# Patient Record
Sex: FEMALE | Race: WHITE | NOT HISPANIC OR LATINO
[De-identification: names, ages, dates, MRNs, and addresses within clinical notes are randomized per-mention and may not be internally consistent; named-entity substitution may affect disease eponyms.]

---

## 2021-01-20 ENCOUNTER — RESULT REVIEW (OUTPATIENT)
Age: 36
End: 2021-01-20

## 2022-03-21 PROBLEM — Z00.00 ENCOUNTER FOR PREVENTIVE HEALTH EXAMINATION: Status: ACTIVE | Noted: 2022-03-21

## 2022-03-24 DIAGNOSIS — Z86.59 PERSONAL HISTORY OF OTHER MENTAL AND BEHAVIORAL DISORDERS: ICD-10-CM

## 2022-03-24 DIAGNOSIS — Z98.890 OTHER SPECIFIED POSTPROCEDURAL STATES: ICD-10-CM

## 2022-03-24 DIAGNOSIS — Z86.03 PERSONAL HISTORY OF NEOPLASM OF UNCERTAIN BEHAVIOR: ICD-10-CM

## 2022-03-28 ENCOUNTER — APPOINTMENT (OUTPATIENT)
Dept: SURGERY | Facility: CLINIC | Age: 37
End: 2022-03-28
Payer: COMMERCIAL

## 2022-03-28 VITALS
WEIGHT: 125 LBS | BODY MASS INDEX: 22.15 KG/M2 | SYSTOLIC BLOOD PRESSURE: 116 MMHG | DIASTOLIC BLOOD PRESSURE: 71 MMHG | TEMPERATURE: 97.4 F | HEART RATE: 66 BPM | HEIGHT: 63 IN

## 2022-03-28 DIAGNOSIS — Z78.9 OTHER SPECIFIED HEALTH STATUS: ICD-10-CM

## 2022-03-28 NOTE — CONSULT LETTER
[Dear  ___] : Dear  [unfilled], [( Thank you for referring [unfilled] for consultation for _____ )] : Thank you for referring [unfilled] for consultation for [unfilled] [Please see my note below.] : Please see my note below. [Consult Closing:] : Thank you very much for allowing me to participate in the care of this patient.  If you have any questions, please do not hesitate to contact me. [Sincerely,] : Sincerely, [FreeTextEntry3] : Gabriela Mccrary MD

## 2022-03-28 NOTE — HISTORY OF PRESENT ILLNESS
[de-identified] : 37 yo F referred for evaluation of possible hernia. The patient is 7 months postpartum from her second child. After her first, she thinks she developed an umbilical hernia that never resolved. After this pregnancy, she has been unable to flatter her abdomen despite being at baseline weight. She reports occasional nausea but improved bowel habits. She denies pain, fevers, chills. She had  x 2 but no other abdominal operations.

## 2022-03-28 NOTE — ASSESSMENT
[FreeTextEntry1] : 37 yo F with large diastasis recti and small umbilical hernia. We discussed the anatomy of the abdominal wall and the risks of hernias, which in her case is small and likely not posing any significant risks of incarceration. However, I believe she would benefit from plastic surgery consultation for possible diastasis repair with abdominoplasty in conjunction with a hernia repair. She will consider this.\par \par

## 2022-04-07 ENCOUNTER — APPOINTMENT (OUTPATIENT)
Dept: PLASTIC SURGERY | Facility: CLINIC | Age: 37
End: 2022-04-07
Payer: SELF-PAY

## 2022-04-07 VITALS
DIASTOLIC BLOOD PRESSURE: 69 MMHG | SYSTOLIC BLOOD PRESSURE: 106 MMHG | TEMPERATURE: 98.7 F | OXYGEN SATURATION: 100 % | HEART RATE: 80 BPM | RESPIRATION RATE: 20 BRPM

## 2022-04-07 VITALS
DIASTOLIC BLOOD PRESSURE: 70 MMHG | HEIGHT: 63 IN | SYSTOLIC BLOOD PRESSURE: 110 MMHG | WEIGHT: 125 LBS | BODY MASS INDEX: 22.15 KG/M2

## 2022-04-07 NOTE — HISTORY OF PRESENT ILLNESS
[FreeTextEntry1] : Patient 7 months post c section (9.5 lb baby) with umbilical hernia and large rectus diastasis with central protrusion of the abdomen.  She is bothered by bulging and skin excess and while scheduled for hernia repair with Dr. Mccrary wishes to discuss possible concurrent Abdominoplasty.  She is otherwise in good health and is a non smoker

## 2022-04-07 NOTE — ASSESSMENT
[FreeTextEntry1] : A:\par Healthy 36 year old woman with umbilical hernia for repair with Dr. Mccrary noted to have a large rectus diastasis with central bulging.\par P:\par We discussed the treatment options at some length and favor Abdominoplasty with rectus plication at the time of her hernia repair.  Reviewed the material risks,  benefits,  and alternatives with Ms. GHANSHYAM YVROSE  , including no surgery, and she  understands and wishes to proceed.\par

## 2022-04-07 NOTE — PHYSICAL EXAM
[NI] : Normal [de-identified] : central bulging in abdomen with 4-6 cm diastasis, and skin excess\par small umbilical hernia palpated

## 2022-04-07 NOTE — REASON FOR VISIT
[Consultation] : a consultation visit [FreeTextEntry1] : 36 year old women with umbilical hernia and rectus diastasis referred by her surgeon for evaluation for Abdominoplasty at time of hernia repair

## 2022-04-27 ENCOUNTER — RESULT REVIEW (OUTPATIENT)
Age: 37
End: 2022-04-27

## 2023-12-04 ENCOUNTER — TRANSCRIPTION ENCOUNTER (OUTPATIENT)
Age: 38
End: 2023-12-04

## 2023-12-06 ENCOUNTER — NON-APPOINTMENT (OUTPATIENT)
Age: 38
End: 2023-12-06

## 2023-12-11 ENCOUNTER — APPOINTMENT (OUTPATIENT)
Dept: SURGERY | Facility: CLINIC | Age: 38
End: 2023-12-11
Payer: COMMERCIAL

## 2023-12-11 VITALS
HEART RATE: 67 BPM | SYSTOLIC BLOOD PRESSURE: 100 MMHG | DIASTOLIC BLOOD PRESSURE: 62 MMHG | HEIGHT: 63 IN | WEIGHT: 125 LBS | BODY MASS INDEX: 22.15 KG/M2

## 2024-01-03 ENCOUNTER — APPOINTMENT (OUTPATIENT)
Dept: PLASTIC SURGERY | Facility: CLINIC | Age: 39
End: 2024-01-03
Payer: SELF-PAY

## 2024-01-03 VITALS
BODY MASS INDEX: 23.03 KG/M2 | OXYGEN SATURATION: 99 % | HEART RATE: 67 BPM | SYSTOLIC BLOOD PRESSURE: 110 MMHG | DIASTOLIC BLOOD PRESSURE: 73 MMHG | WEIGHT: 130 LBS

## 2024-01-03 DIAGNOSIS — K42.9 UMBILICAL HERNIA W/OUT OBSTRUCTION OR GANGRENE: ICD-10-CM

## 2024-01-03 RX ORDER — HYDROCORTISONE 25 MG/G
2.5 CREAM TOPICAL
Qty: 20 | Refills: 0 | Status: COMPLETED | COMMUNITY
Start: 2021-12-16 | End: 2024-01-03

## 2024-01-03 RX ORDER — CLINDAMYCIN HYDROCHLORIDE 300 MG/1
300 CAPSULE ORAL
Qty: 40 | Refills: 0 | Status: COMPLETED | COMMUNITY
Start: 2021-10-07 | End: 2024-01-03

## 2024-01-03 RX ORDER — METHYLPREDNISOLONE 4 MG/1
4 TABLET ORAL
Qty: 21 | Refills: 0 | Status: COMPLETED | COMMUNITY
Start: 2021-12-09 | End: 2024-01-03

## 2024-01-03 RX ORDER — TRIAMCINOLONE ACETONIDE 1 MG/G
0.1 CREAM TOPICAL
Qty: 80 | Refills: 0 | Status: COMPLETED | COMMUNITY
Start: 2021-12-13 | End: 2024-01-03

## 2024-01-03 NOTE — REASON FOR VISIT
[Follow-Up: _____] : a [unfilled] follow-up visit [FreeTextEntry1] : Ms. GHANSHYAM FRANCES returns for a follow up visit, generally doing well with no complaints and no fevers or chills at home, planning hernia repair with Dr. Mccrary and inerested in ABdominoplasty with rectus diastasis repair at same surgery

## 2024-01-03 NOTE — PHYSICAL EXAM
[NI] : Normal [de-identified] :  central bulging in abdomen with 4-6 cm diastasis, and skin excess Umbilical hernia palpated

## 2024-01-03 NOTE — ASSESSMENT
[FreeTextEntry1] : A: 6 cm rectus diastasis with lower abdominal skin excess in 38-year-old woman with umbilical hernia following childbirth.  P: Patient scheduled for herniorrhaphy with Dr. Mccrary and interested in concomitant Abdominoplasty with repair rectus diastasis.  Reviewed the material risks,  benefits,  and alternatives with Ms. GHANSHYAM FRANCES  , including no surgery, and she  understands and wishes to proceed.

## 2024-03-01 ENCOUNTER — APPOINTMENT (OUTPATIENT)
Dept: SURGERY | Facility: HOSPITAL | Age: 39
End: 2024-03-01

## 2024-03-01 ENCOUNTER — TRANSCRIPTION ENCOUNTER (OUTPATIENT)
Age: 39
End: 2024-03-01

## 2024-03-01 ENCOUNTER — APPOINTMENT (OUTPATIENT)
Dept: PLASTIC SURGERY | Facility: HOSPITAL | Age: 39
End: 2024-03-01
Payer: SELF-PAY

## 2024-03-07 ENCOUNTER — APPOINTMENT (OUTPATIENT)
Dept: PLASTIC SURGERY | Facility: CLINIC | Age: 39
End: 2024-03-07
Payer: COMMERCIAL

## 2024-03-07 VITALS
HEART RATE: 70 BPM | DIASTOLIC BLOOD PRESSURE: 69 MMHG | RESPIRATION RATE: 22 BRPM | TEMPERATURE: 97.2 F | OXYGEN SATURATION: 99 % | SYSTOLIC BLOOD PRESSURE: 107 MMHG

## 2024-03-07 NOTE — REASON FOR VISIT
[Post Op: _________] : a [unfilled] post op visit [FreeTextEntry1] : Ms. GHANSHYAM FRANCES returns for a follow up visit, generally doing well with no complaints and no fevers or chills at home, drains less than 20 cc per 24 hours

## 2024-03-07 NOTE — PHYSICAL EXAM
[NI] : Normal [de-identified] : Shape and contour are good, incisions are clean and intact. Flaps are viable with no palpable collection

## 2024-03-21 ENCOUNTER — APPOINTMENT (OUTPATIENT)
Dept: PLASTIC SURGERY | Facility: CLINIC | Age: 39
End: 2024-03-21
Payer: COMMERCIAL

## 2024-03-21 VITALS
OXYGEN SATURATION: 99 % | SYSTOLIC BLOOD PRESSURE: 103 MMHG | TEMPERATURE: 97.9 F | HEART RATE: 75 BPM | DIASTOLIC BLOOD PRESSURE: 76 MMHG

## 2024-03-21 NOTE — REASON FOR VISIT
[FreeTextEntry1] : Ms. GHANSHYAM FRANCES returns for a follow up visit, generally doing well with no complaints and no fevers or chills at home, generally pleased with results of her surgery  [Post Op: _________] : a [unfilled] post op visit

## 2024-03-21 NOTE — PHYSICAL EXAM
[de-identified] : Shape and contour are good- Prineo removed, scar clean and intact [NI] : Normal

## 2024-03-21 NOTE — ASSESSMENT
[FreeTextEntry1] : A: Doing well post operative P; Reviewed scar care and resumption of physical activity

## 2024-06-06 ENCOUNTER — APPOINTMENT (OUTPATIENT)
Dept: PLASTIC SURGERY | Facility: CLINIC | Age: 39
End: 2024-06-06
Payer: COMMERCIAL

## 2024-06-06 VITALS — OXYGEN SATURATION: 99 % | SYSTOLIC BLOOD PRESSURE: 124 MMHG | HEART RATE: 71 BPM | DIASTOLIC BLOOD PRESSURE: 90 MMHG

## 2024-06-06 DIAGNOSIS — M62.08 SEPARATION OF MUSCLE (NONTRAUMATIC), OTHER SITE: ICD-10-CM

## 2024-06-06 NOTE — PHYSICAL EXAM
[NI] : Normal [de-identified] : Shape and contour are good, incisions clean and intact. Small fullness just above the umbilicus

## 2024-06-06 NOTE — REASON FOR VISIT
[Follow-Up: _____] : a [unfilled] follow-up visit [FreeTextEntry1] : Ms. GHANSHYAM FRANCES returns for a follow up visit, generally doing well with no complaints and no fevers or chills at home.